# Patient Record
Sex: FEMALE | Race: WHITE
[De-identification: names, ages, dates, MRNs, and addresses within clinical notes are randomized per-mention and may not be internally consistent; named-entity substitution may affect disease eponyms.]

---

## 2018-05-08 ENCOUNTER — HOSPITAL ENCOUNTER (OUTPATIENT)
Dept: HOSPITAL 97 - ER | Age: 58
Setting detail: OBSERVATION
LOS: 1 days | Discharge: HOME | End: 2018-05-09
Attending: FAMILY MEDICINE | Admitting: FAMILY MEDICINE
Payer: COMMERCIAL

## 2018-05-08 VITALS — OXYGEN SATURATION: 99 %

## 2018-05-08 VITALS — BODY MASS INDEX: 29.2 KG/M2

## 2018-05-08 DIAGNOSIS — J44.9: ICD-10-CM

## 2018-05-08 DIAGNOSIS — F17.210: ICD-10-CM

## 2018-05-08 DIAGNOSIS — F32.9: ICD-10-CM

## 2018-05-08 DIAGNOSIS — K52.9: ICD-10-CM

## 2018-05-08 DIAGNOSIS — I10: ICD-10-CM

## 2018-05-08 DIAGNOSIS — N30.00: Primary | ICD-10-CM

## 2018-05-08 LAB
ALBUMIN SERPL BCP-MCNC: 3.8 G/DL (ref 3.2–5.5)
ALP SERPL-CCNC: 87 IU/L (ref 42–121)
ALT SERPL W P-5'-P-CCNC: 16 IU/L (ref 10–60)
AST SERPL W P-5'-P-CCNC: 20 IU/L (ref 10–42)
BUN BLD-MCNC: 10 MG/DL (ref 6–20)
CKMB CREATINE KINASE MB: 1.5 NG/ML (ref 0.3–4)
GLUCOSE SERPLBLD-MCNC: 102 MG/DL (ref 65–120)
HCT VFR BLD CALC: 43.7 % (ref 36–45)
INR BLD: 1.03
LIPASE SERPL-CCNC: 19 U/L (ref 22–51)
LYMPHOCYTES # SPEC AUTO: 2.8 K/UL (ref 0.7–4.9)
MAGNESIUM SERPL-MCNC: 1.9 MG/DL (ref 1.8–2.5)
MCH RBC QN AUTO: 25.8 PG (ref 27–35)
MCV RBC: 78.6 FL (ref 80–100)
PMV BLD: 7.6 FL (ref 7.6–11.3)
POTASSIUM SERPL-SCNC: 3.5 MEQ/L (ref 3.6–5)
RBC # BLD: 5.56 M/UL (ref 3.86–4.86)

## 2018-05-08 PROCEDURE — 36415 COLL VENOUS BLD VENIPUNCTURE: CPT

## 2018-05-08 PROCEDURE — 74177 CT ABD & PELVIS W/CONTRAST: CPT

## 2018-05-08 PROCEDURE — 80053 COMPREHEN METABOLIC PANEL: CPT

## 2018-05-08 PROCEDURE — 87077 CULTURE AEROBIC IDENTIFY: CPT

## 2018-05-08 PROCEDURE — 85610 PROTHROMBIN TIME: CPT

## 2018-05-08 PROCEDURE — 82550 ASSAY OF CK (CPK): CPT

## 2018-05-08 PROCEDURE — 71045 X-RAY EXAM CHEST 1 VIEW: CPT

## 2018-05-08 PROCEDURE — 87088 URINE BACTERIA CULTURE: CPT

## 2018-05-08 PROCEDURE — 87086 URINE CULTURE/COLONY COUNT: CPT

## 2018-05-08 PROCEDURE — 83690 ASSAY OF LIPASE: CPT

## 2018-05-08 PROCEDURE — 81003 URINALYSIS AUTO W/O SCOPE: CPT

## 2018-05-08 PROCEDURE — 99285 EMERGENCY DEPT VISIT HI MDM: CPT

## 2018-05-08 PROCEDURE — 85730 THROMBOPLASTIN TIME PARTIAL: CPT

## 2018-05-08 PROCEDURE — 89055 LEUKOCYTE ASSESSMENT FECAL: CPT

## 2018-05-08 PROCEDURE — 96375 TX/PRO/DX INJ NEW DRUG ADDON: CPT

## 2018-05-08 PROCEDURE — 87046 STOOL CULTR AEROBIC BACT EA: CPT

## 2018-05-08 PROCEDURE — 85025 COMPLETE CBC W/AUTO DIFF WBC: CPT

## 2018-05-08 PROCEDURE — 87493 C DIFF AMPLIFIED PROBE: CPT

## 2018-05-08 PROCEDURE — 83880 ASSAY OF NATRIURETIC PEPTIDE: CPT

## 2018-05-08 PROCEDURE — 87186 SC STD MICRODIL/AGAR DIL: CPT

## 2018-05-08 PROCEDURE — 84484 ASSAY OF TROPONIN QUANT: CPT

## 2018-05-08 PROCEDURE — 80156 ASSAY CARBAMAZEPINE TOTAL: CPT

## 2018-05-08 PROCEDURE — 96361 HYDRATE IV INFUSION ADD-ON: CPT

## 2018-05-08 PROCEDURE — 80076 HEPATIC FUNCTION PANEL: CPT

## 2018-05-08 PROCEDURE — 82553 CREATINE MB FRACTION: CPT

## 2018-05-08 PROCEDURE — 96368 THER/DIAG CONCURRENT INF: CPT

## 2018-05-08 PROCEDURE — 82962 GLUCOSE BLOOD TEST: CPT

## 2018-05-08 PROCEDURE — 96365 THER/PROPH/DIAG IV INF INIT: CPT

## 2018-05-08 PROCEDURE — 87045 FECES CULTURE AEROBIC BACT: CPT

## 2018-05-08 PROCEDURE — 83735 ASSAY OF MAGNESIUM: CPT

## 2018-05-08 PROCEDURE — 80048 BASIC METABOLIC PNL TOTAL CA: CPT

## 2018-05-08 PROCEDURE — 84100 ASSAY OF PHOSPHORUS: CPT

## 2018-05-08 PROCEDURE — 93005 ELECTROCARDIOGRAM TRACING: CPT

## 2018-05-08 RX ADMIN — SODIUM CHLORIDE SCH: 0.9 INJECTION, SOLUTION INTRAVENOUS at 21:13

## 2018-05-08 RX ADMIN — Medication SCH: at 21:13

## 2018-05-08 RX ADMIN — CIPROFLOXACIN SCH: 2 INJECTION, SOLUTION INTRAVENOUS at 21:13

## 2018-05-08 NOTE — EDPHYS
Physician Documentation                                                                           

 [facility]                                                                

Name: Rima Solares                                                                                

Age: 58 yrs                                                                                       

Sex: Female                                                                                       

: 1960                                                                                   

MRN: [id number]                                                                                   

Arrival Date: 2018                                                                          

Time: 16:04                                                                                       

Account#: [id number]                                                                            

Bed 19                                                                                            

Private MD:                                                                                       

ED Physician Edgardo Shaikh                                                                      

HPI:                                                                                              

                                                                                             

16:20 This 58 yrs old  Female presents to ER via EMS with complaints of Abdominal    karuna 

      Cramping, Nausea/Vomiting/Diarrhea.                                                         

                                                                                                  

Historical:                                                                                       

- Allergies:                                                                                      

16:17 Trazodone;                                                                              ae1 

- Home Meds:                                                                                      

16:17 Lisinopril Oral [Active]; Abilify oral oral [Active]; Carbamazepine Oral [Active];      ae1 

16:26 aspirin 81 mg oral chew [Active]; Paxil 20 mg oral tab 1 tab once daily [Active];       ae1 

      metoprolol tartrate 25 mg oral tab 1.5 tab 2 times per day [Active]; atorvastatin 80 mg     

      oral tab 1 tab nightly [Active]; BRILINTA 90 mg oral tab [Active]; omeprazole 30 mg         

      Oral once daily [Active];                                                                   

- PMHx:                                                                                           

16:17 Anxiety; COPD; Depression; Hypertension; Hyperlipidemia; Diabetes - NIDDM;              ae1 

                                                                                                  

- Immunization history:: Adult Immunizations not up to date.                                      

- Social history:: Smoking status: Patient uses tobacco products, smokes one pack                 

  cigarettes per day.                                                                             

                                                                                                  

                                                                                                  

ROS:                                                                                              

16:21 Constitutional: Negative for fever, chills, and weight loss, Eyes: Negative for injury, karuna 

      pain, redness, and discharge, ENT: Negative for injury, pain, and discharge, Neck:          

      Negative for injury, pain, and swelling, Cardiovascular: Negative for chest pain,           

      palpitations, and edema, Respiratory: Negative for shortness of breath, cough,              

      wheezing, and pleuritic chest pain, Back: Negative for injury and pain, : Negative        

      for injury, bleeding, discharge, and swelling, MS/Extremity: Negative for injury and        

      deformity, Skin: Negative for injury, rash, and discoloration, Neuro: Negative for          

      headache, weakness, numbness, tingling, and seizure, Psych: Negative for depression,        

      anxiety, suicide ideation, homicidal ideation, and hallucinations, Allergy/Immunology:      

      Negative for hives, rash, and allergies, Endocrine: Negative for neck swelling,             

      polydipsia, polyuria, polyphagia, and marked weight changes, Hematologic/Lymphatic:         

      Negative for swollen nodes, abnormal bleeding, and unusual bruising.                        

16:21 Abdomen/GI: Positive for abdominal pain, nausea and vomiting, diarrhea.                     

                                                                                                  

Exam:                                                                                             

16:21 Constitutional:  This is a well developed, well nourished patient who is awake, alert,  karuna 

      and in no acute distress. Head/Face:  Normocephalic, atraumatic. Eyes:  Pupils equal        

      round and reactive to light, extra-ocular motions intact.  Lids and lashes normal.          

      Conjunctiva and sclera are non-icteric and not injected.  Cornea within normal limits.      

      Periorbital areas with no swelling, redness, or edema. ENT:  Nares patent. No nasal         

      discharge, no septal abnormalities noted.  Tympanic membranes are normal and external       

      auditory canals are clear.  Oropharynx with no redness, swelling, or masses, exudates,      

      or evidence of obstruction, uvula midline.  Mucous membranes moist. Neck:  Trachea          

      midline, no thyromegaly or masses palpated, and no cervical lymphadenopathy.  Supple,       

      full range of motion without nuchal rigidity, or vertebral point tenderness.  No            

      Meningismus. Chest/axilla:  Normal chest wall appearance and motion.  Nontender with no     

      deformity.  No lesions are appreciated. Cardiovascular:  Regular rate and rhythm with a     

      normal S1 and S2.  No gallops, murmurs, or rubs.  Normal PMI, no JVD.  No pulse             

      deficits. Respiratory:  Lungs have equal breath sounds bilaterally, clear to                

      auscultation and percussion.  No rales, rhonchi or wheezes noted.  No increased work of     

      breathing, no retractions or nasal flaring. Abdomen/GI:  Soft, non-tender, with normal      

      bowel sounds.  No distension or tympany.  No guarding or rebound.  No evidence of           

      tenderness throughout. Back:  No spinal tenderness.  No costovertebral tenderness.          

      Full range of motion. Female :  Normal external genitalia. Skin:  Warm, dry with          

      normal turgor.  Normal color with no rashes, no lesions, and no evidence of cellulitis.     

18:12 Musculoskeletal/extremity: DVT Exam: No signs of deep vein thrombosis. no pain, no      karuna 

      swelling, no tenderness, negative Homans' sign noted on exam, no appreciated bluish         

      discoloration, no erythema, no increased warmth.                                            

                                                                                                  

Vital Signs:                                                                                      

16:05  / 75; Pulse 81; Resp 20; Temp 98.4(O); Pulse Ox 95% on R/A; Weight 77.11 kg (R); ae1 

17:19  / 73; Pulse 76; Resp 18; Pulse Ox 96% on R/A;                                    ae1 

17:30  / 73; Pulse 80; Resp 18; Pulse Ox 100% on R/A;                                   ae1 

18:00  / 75; Pulse 80; Resp 18; Pulse Ox 99% on R/A;                                    ae1 

19:22  / 83; Pulse 83; Resp 19; Temp 98.5(O);                                           ae1 

20:43  / 75; Pulse 80; Resp 20 S; Pulse Ox 99% on R/A;                                  ea  

                                                                                                  

MDM:                                                                                              

16:12 Patient medically screened.                                                             [facility] 

16:21 Data reviewed: vital signs, nurses notes, lab test result(s), EKG, radiologic studies,  karuna 

      plain films.                                                                                

                                                                                                  

                                                                                             

16:20 Order name: Basic Metabolic Panel                                                       [facility] 

                                                                                             

16:20 Order name: BNP                                                                         [facility] 

                                                                                             

16:20 Order name: CBC with Diff; Complete Time: 17:36                                         [facility] 

                                                                                             

16:20 Order name: Ckmb; Complete Time: 17:51                                                  [facility] 

                                                                                             

16:20 Order name: CPK; Complete Time: 17:51                                                   [facility] 

                                                                                             

16:20 Order name: LFT's; Complete Time: 17:51                                                 [facility] 

                                                                                             

16:20 Order name: Magnesium; Complete Time: 17:51                                             [facility] 

                                                                                             

16:20 Order name: PT-INR; Complete Time: 17:36                                                [facility] 

                                                                                             

16:20 Order name: Ptt, Activated; Complete Time: 17:36                                        [facility] 

                                                                                             

16:20 Order name: Troponin (emerg Dept Use Only); Complete Time: 17:41                        [facility] 

                                                                                             

16:20 Order name: Lipase; Complete Time: 17:51                                                [facility] 

                                                                                             

16:20 Order name: Stool Culture                                                               [facility] 

                                                                                             

16:20 Order name: Fecal Leukocyte Stain                                                       [facility] 

                                                                                             

16:20 Order name: CDIFF                                                                       [facility] 

                                                                                             

16:20 Order name: XRAY Chest (1 view); Complete Time: 17:36                                   [facility] 

                                                                                             

16:20 Order name: EKG; Complete Time: 16:21                                                   [facility] 

                                                                                             

16:20 Order name: Cardiac monitoring; Complete Time: 17:18                                    [facility] 

                                                                                             

16:20 Order name: CT Abd/Pelvis - W/Contrast                                                  [facility] 

                                                                                             

16:20 Order name: Basic Metabolic Panel; Complete Time: 17:51                                 [facility]

                                                                                             

16:20 Order name: BNP B-Type Natriuretic Peptide                                              [facility]

                                                                                             

16:24 Order name: Urine Culture                                                               [facility] 

                                                                                             

17:38 Order name: Tegretol Level                                                              [facility] 

                                                                                             

17:41 Order name: Urine Dipstick--Ancillary (enter results)                                     

                                                                                             

19:38 Order name: CT                                                                          [facility]

                                                                                             

16:20 Order name: EKG - Nurse/Tech; Complete Time: 16:22                                      [facility] 

                                                                                             

16:20 Order name: IV Saline Lock; Complete Time: 16:58                                        [facility] 

                                                                                             

16:20 Order name: Labs collected and sent; Complete Time: 16:58                               [facility] 

                                                                                             

16:20 Order name: O2 Per Protocol; Complete Time: 16:58                                       [facility] 

                                                                                             

16:20 Order name: O2 Sat Monitoring; Complete Time: 16:58                                     [facility] 

                                                                                             

16:20 Order name: Urine Dipstick-Ancillary (obtain specimen); Complete Time: 17:18            [facility] 

                                                                                                  

Administered Medications:                                                                         

16:45 Drug: NS 0.9% 1000 ml Route: IV; Rate: 1 bolus; Site: left forearm;                     ae1 

19:21 Follow up: IV Status: Completed infusion                                                ae1 

16:45 Drug: Zofran 4 mg Route: IVP; Site: left forearm;                                       ae1 

19:21 Follow up: Response: Nausea is decreased                                                ae1 

16:50 Drug: Pepcid 20 mg Route: IVP; Site: left forearm;                                      ae1 

19:21 Follow up: Response: No adverse reaction                                                ae1 

19:20 Drug: Cipro 400 mg Volume: 200 ml; Route: IVPB; Infused Over: 60 mins; Site: left upper ae1 

      arm;                                                                                        

20:52 Follow up: Response: No adverse reaction; IV Status: Completed infusion; IV Intake:     ea  

      200ml                                                                                       

19:21 Drug: Flagyl 500 mg Volume: 100 ml; Route: IVPB; Rate: 200 ml/hr; Infused Over: 30      ae1 

      mins; Site: left upper arm;                                                                 

20:51 Follow up: IV Status: Completed infusion; IV Intake: 100ml                              ea  

20:52 Drug: NS 0.9% with KCl 20 mEq/L 1000 ml {Note: left upper arm.} Route: IV; Rate: 100    ea  

      ml/hr; Site: Other;                                                                         

21:02 Follow up: Response: No adverse reaction; IV Status: Infusion continued upon admission  ea  

                                                                                                  

                                                                                                  

Disposition:                                                                                      

18 17:42 Hospitalization ordered by Ebony Arechiga for Inpatient Admission. Preliminary     

  diagnosis are Weakness, Vomiting, Diarrhea, unspecified, Hypotension,                           

  Hypokalemia, Elevated white blood cell count.                                                   

- Bed requested for Telemetry/MedSurg (Inpatient).                                                

- Status is Inpatient Admission.                                                              ea  

- Condition is Fair.                                                                              

- Problem is new.                                                                                 

- Symptoms have improved.                                                                         

UTI on Admission? No                                                                              

                                                                                                  

                                                                                                  

                                                                                                  

Signatures:                                                                                       

Dispatcher [employer]                           EDMS                                                 

Edgardo Shaikh MD MD cha Gallardo, Ana ag Elliott, Andrea, RN                     RN   ae1                                                  

Basia Blount RN                      RN   ea                                                   

                                                                                                  

Corrections: (The following items were deleted from the chart)                                    

16:26 16:17 Home Meds: Aspirin Oral; ae1                                                      ae1 

16:26 16:17 Home Meds: Paxil Oral; ae1                                                        ae1 

16:26 16:17 Home Meds: Metoprolol Tartrate Oral; ae1                                          ae1 

16:26 16:17 Home Meds: atorvastatin oral oral; ae1                                            ae1 

16:26 16:17 Home Meds: BRILINTA oral oral 1 tab 2 times per day; ae1                          ae1 

18:38 17:42 Hospitalization Ordered by Ebony Arechiga MD for Inpatient Admission. Preliminary  ag  

      diagnosis is Weakness; Vomiting; Diarrhea, unspecified; Hypotension; Hypokalemia;           

      Elevated white blood cell count. Bed requested for Telemetry/MedSurg (Inpatient).           

      Status is Inpatient Admission. Condition is Fair. Problem is new. Symptoms have             

      improved. UTI on Admission? No. karuna                                                         

21:07 18:38 2018 17:42 Hospitalization Ordered by Ebony Arechiga MD for Inpatient        ea  

      Admission. Preliminary diagnosis is Weakness; Vomiting; Diarrhea, unspecified;              

      Hypotension; Hypokalemia; Elevated white blood cell count. Bed requested for                

      Telemetry/MedSurg (Inpatient). Status is Inpatient Admission. Condition is Fair.            

      Problem is new. Symptoms have improved. UTI on Admission? No. ag                            

                                                                                                  

**************************************************************************************************

## 2018-05-08 NOTE — P.HP
Certification for Inpatient


Patient admitted to: Observation


With expected LOS: <2 Midnights


Patient will require the following post-hospital care: None


Practitioner: I am a practitioner with admitting privileges, knowledge of 

patient current condition, hospital course, and medical plan of care.


Services: Services provided to patient in accordance with Admission 

requirements found in Title 42 Section 412.3 of the Code of Federal Regulations





Patient History


Date of Service: 05/08/18


Primary Care Provider: None, [facility] 


Reason for admission: N/V


History of Present Illness: 





50-year-old female with significant past medical history of depression, COPD, 

smoker presented to the ED from the [facility].  Patient stated that she 

went to the [facility] today and was told to come to the ER because she 

appeared to be dehydrated.  Patient has had nausea vomiting and diarrhea for 

about 3-4 months now and has been followed up at [facility] for it.  He 

did do some lab work care however results are still pending.  Patient was then 

referred over to the ER to get further workup.  Patient denies having any fever 

chills shortness of breath or chest pain any other associated symptoms with it.

  Patient stated aside from diarrhea and nausea vomiting she has not had any 

other complaints.  Nausea and vomiting has been getting worse lately and she 

has not been able to keep anything down as well.


Allergies





trazodone Adverse Reaction (Verified 03/15/15 21:29)


 Nausea/Vomiting





Home Medications: 








Albuterol Sulfate [Proair Hfa] 2 puff IH Q4HP PRN 03/15/15 


Aripiprazole [Abilify] 30 mg PO BEDTIME 03/15/15 


Carbamazepine [Carbamazepine ER] 200 mg PO TID 03/15/15 


Gemfibrozil 600 mg PO BID 03/15/15 


Lisinopril 10 mg PO DAILY 03/15/15 


Paroxetine HCl [Paxil] 20 mg PO DAILY 03/15/15 


Fluticasone/Salmeterol [Advair 250/50 Diskus] 1 puff IH BID #1 disk 03/17/15 


Levofloxacin [Levaquin] 500 mg PO DAILY #7 tab 03/17/15 








- Past Medical/Surgical History


Diabetic: No


-: copd


-: htn


-: high cholesterol


-: deression


-: anxiety


-: bronchitis


-: neck sx x2-


-: "nerve fusion"





- Family History


  ** Father


-: Heart disease, Hypertension


Notes: pt unsure of details





- Social History


Alcohol use: No


CD- Drugs: No


Caffeine use: Yes





Review of Systems


General: As per HPI





Physical Examination





- Physical Exam


General: Alert, In no apparent distress


HEENT: Atraumatic, PERRLA, Mucous membr. moist/pink, EOMI, Sclerae nonicteric


Neck: Supple, 2+ carotid pulse no bruit, No LAD, Without JVD or thyroid 

abnormality


Respiratory: Clear to auscultation bilaterally, Normal air movement


Cardiovascular: Regular rate/rhythm, Normal S1 S2


Gastrointestinal: Normal bowel sounds, No tenderness


Musculoskeletal: No tenderness


Integumentary: No rashes


Neurological: Normal gait, Normal speech, Normal strength at 5/5 x4 extr, 

Normal tone, Normal affect


Lymphatics: No axilla or inguinal lymphadenopathy





- Studies


Laboratory Data (last 24 hrs)





05/08/18 16:44: PT 12.1, INR 1.03, APTT 25.7


05/08/18 16:44: WBC 15.3 H, Hgb 14.4, Hct 43.7, Plt Count 480 H


05/08/18 16:44: Sodium 136, Potassium 3.5 L, BUN 10, Creatinine 0.78, Glucose 

102, Magnesium 1.9, Total Bilirubin 0.2 L, AST 20, ALT 16, Alkaline Phosphatase 

87, Lipase 19 L








Assessment and Plan





- Problems (Diagnosis)


(1) Diarrhea


Current Visit: Yes   Status: Acute   


Plan: 


Chronic Diarrhea since 4 to 5 months now with Nausea and vomiting. 


-Stool studies pending 


-Cipro and flagyl 


-CT abd pending


-IV fluids and CLD 





Qualifiers: 


   Diarrhea type: unspecified type   Qualified Code(s): R19.7 - Diarrhea, 

unspecified   





(2) Intractable nausea and vomiting


Current Visit: Yes   Status: Acute   


Plan: 


Worsening now. Intractable n/v 


-Zofran PRN 





Qualifiers: 


   Vomiting type: cyclical vomiting   Qualified Code(s): G43.A1 - Cyclical 

vomiting, intractable   





(3) COPD (chronic obstructive pulmonary disease)


Current Visit: Yes   Status: Chronic   


Plan: 


Stable


-Restart home medication 





Qualifiers: 


   COPD type: unspecified COPD   Qualified Code(s): J44.9 - Chronic obstructive 

pulmonary disease, unspecified   





(4) Depression


Current Visit: Yes   Status: Chronic   


Qualifiers: 


   Depression Type: unspecified   Qualified Code(s): F32.9 - Major depressive 

disorder, single episode, unspecified   


Discharge Plan: Home


Plan to discharge in: 24 Hours





- Advance Directives


Does patient have a Living Will: No


Does patient have a Durable POA for Healthcare: No





- Code Status/Comfort Care


Code Status Assessed: Yes


Critical Care: No

## 2018-05-08 NOTE — RAD REPORT
EXAM DESCRIPTION:  RAD - Chest Single View - 5/8/2018 5:07 pm

 

CLINICAL HISTORY:  Chest pain, nausea and vomiting.

 

COMPARISON:  03/24/2017, 01/22/2016

 

FINDINGS:  Portable technique limits examination quality.

 

The lungs are grossly clear. The heart is normal in size. No displaced fractures.Cervical spine hardw
are present.

 

IMPRESSION:  No acute intrathoracic process suspected.

## 2018-05-08 NOTE — RAD REPORT
EXAM DESCRIPTION:  CT - Abdomen   Pelvis W Contrast - 5/8/2018 7:11 pm

 

CLINICAL HISTORY:   Abdominal pain. Nausea, vomiting and diarrhea

 

COMPARISON:  None.

 

TECHNIQUE:  Computed axial tomography of the abdomen and pelvis was obtained. 100 cc Isovue-300 is ad
ministered intravenously. Oral contrast was given.

 

All CT scans are performed using dose optimization technique as appropriate and may include automated
 exposure control or mA/KV adjustment according to patient size.

 

FINDINGS:

The liver, spleen, pancreas, adrenals and kidneys appear unremarkable.

 

Mild dilatation of the common bile duct is present.

 

Portions of the colon are mildly thickened.

 

The appendix is mildly thickened. However there is contrast within portions of the lumen.

 

 

IMPRESSION:  Mild dilatation of the wall of portions of the colon consistent with a mild colitis.

 

Mild dilatation of the appendix. My suspicion is that this is not significant given the colitis. Freeman
jenny a superimposed appendicitis has a similar appearance. Followup CT examination may be helpful for 
re-evaluation.

 

Mild dilatation of the common bile duct

## 2018-05-08 NOTE — ER
Nurse's Notes                                                                                     

 [facility]                                                                

Name: Rima Solares                                                                                

Age: 58 yrs                                                                                       

Sex: Female                                                                                       

: 1960                                                                                   

MRN: [id number]                                                                                   

Arrival Date: 2018                                                                          

Time: 16:04                                                                                       

Account#: [id number]                                                                            

Bed 19                                                                                            

Private MD:                                                                                       

Diagnosis: Weakness;Vomiting;Diarrhea, unspecified;Hypotension;Hypokalemia;Elevated white blood   

  cell count                                                                                      

                                                                                                  

Presentation:                                                                                     

                                                                                             

16:08 Presenting complaint: EMS states: EMS states patient has been suffering from nausea,    ae1 

      vomiting, diarrhea for the last few months. Today patient reports fatigue and lethargy.     

      Was seen a [facility] and was told to have and ultrasound of her abdomen and that      

      she was hypotensive while at the clinic. EMS reports BP 98/63 and FSBS 107. Transition      

      of care: patient was not received from another setting of care. Onset of symptoms is        

      unknown. Care prior to arrival: EMS states V/S BP 98/63, 74/38.                             

16:08 Method Of Arrival: EMS: [facility]                                                    ae1 

16:08 Acuity: MARCELA 3                                                                           ae1 

16:59 Initial Sepsis Screen: Does the patient meet any 2 criteria? No. Patient's initial      ae1 

      sepsis screen is negative. Does the patient have a suspected source of infection? Yes:      

      Acute abdominal pain.                                                                       

                                                                                                  

Triage Assessment:                                                                                

16:07 General: Appears comfortable, Behavior is calm, cooperative. Pain: Complains of pain in ae1 

      abdomen. Respiratory: Airway is patent Respiratory effort is even, unlabored,               

      Respiratory pattern is regular, symmetrical.                                                

16:07 GI: Abdomen is round.                                                                   ae1 

16:07 EENT: Oral mucosa is dry. Poor dentition noted. Neuro: Level of Consciousness is awake, ae1 

      alert, obeys commands, Oriented to person, place, time, situation. Cardiovascular:          

      Heart tones S1 S2 present Patient's skin is warm and dry. : No signs and/or symptoms      

      were reported regarding the genitourinary system.                                           

16:07 GI: Bowel sounds present X 4 quads. Reports lower abdominal pain, cramping, diarrhea,   ae1 

      nausea, vomiting. Derm: No signs and/or symptoms reported regarding the dermatologic        

      system. Musculoskeletal: No signs and/or symptoms reported regarding the                    

      musculoskeletal system.                                                                     

                                                                                                  

Historical:                                                                                       

- Allergies:                                                                                      

16:17 Trazodone;                                                                              ae1 

- Home Meds:                                                                                      

16:17 Lisinopril Oral [Active]; Abilify oral oral [Active]; Carbamazepine Oral [Active];      ae1 

16:26 aspirin 81 mg oral chew [Active]; Paxil 20 mg oral tab 1 tab once daily [Active];       ae1 

      metoprolol tartrate 25 mg oral tab 1.5 tab 2 times per day [Active]; atorvastatin 80 mg     

      oral tab 1 tab nightly [Active]; BRILINTA 90 mg oral tab [Active]; omeprazole 30 mg         

      Oral once daily [Active];                                                                   

- PMHx:                                                                                           

16:17 Anxiety; COPD; Depression; Hypertension; Hyperlipidemia; Diabetes - NIDDM;              ae1 

                                                                                                  

- Immunization history:: Adult Immunizations not up to date.                                      

- Social history:: Smoking status: Patient uses tobacco products, smokes one pack                 

  cigarettes per day.                                                                             

                                                                                                  

                                                                                                  

Screenin:59 Abuse screen: Denies threats or abuse. Nutritional screening: No deficits noted.        ae1 

      Tuberculosis screening: No symptoms or risk factors identified. Fall Risk None              

      identified.                                                                                 

                                                                                                  

Assessment:                                                                                       

16:07 GI: Bowel sounds present X 4 quads. Abd is soft Abdomen is tender to palpation in right ae1 

      lower quadrant and left lower quadrant.                                                     

17:00 Reassessment: Patient appears in no apparent distress at this time. Patient and/or      ae1 

      family updated on plan of care and expected duration. Pain level reassessed. Patient        

      states feeling better.                                                                      

18:37 Reassessment: IV to the left forearm infiltrated, IV removed.                           ae1 

19:15 General: Appears in no apparent distress. Behavior is calm, cooperative, appropriate    ea  

      for age. Pain: Complains of pain in left lower quadrant and right lower quadrant Pain       

      does not radiate. Pain currently is 6 out of 10 on a pain scale. Quality of pain is         

      described as crampy. Neuro: Level of Consciousness is awake, alert, obeys commands,         

      Oriented to person, place, time, situation. Cardiovascular: No deficits noted.              

      Respiratory: Airway is patent Respiratory effort is even, unlabored, Respiratory            

      pattern is regular, symmetrical. GI: Bowel sounds present X 4 quads. Abd is soft X 4        

      quads Abdomen is tender to palpation in right lower quadrant and left lower quadrant.       

      : No signs and/or symptoms were reported regarding the genitourinary system. EENT: No     

      signs and/or symptoms were reported regarding the EENT system. Derm: Skin is pink, warm     

      \T\ dry.                                                                                    

20:44 Reassessment: Patient and/or family updated on plan of care and expected duration. Pain ea  

      level reassessed. Patient is alert, oriented x 3, equal unlabored respirations, skin        

      warm/dry/pink.                                                                              

21:00 Reassessment: Patient and/or family updated on plan of care and expected duration. Pain ea  

      level reassessed. Patient is alert, oriented x 3, equal unlabored respirations, skin        

      warm/dry/pink. Report called to Diane ALLEN on fourth floor.                                   

                                                                                                  

Vital Signs:                                                                                      

16:05  / 75; Pulse 81; Resp 20; Temp 98.4(O); Pulse Ox 95% on R/A; Weight 77.11 kg (R); ae1 

17:19  / 73; Pulse 76; Resp 18; Pulse Ox 96% on R/A;                                    ae1 

17:30  / 73; Pulse 80; Resp 18; Pulse Ox 100% on R/A;                                   ae1 

18:00  / 75; Pulse 80; Resp 18; Pulse Ox 99% on R/A;                                    ae1 

19:22  / 83; Pulse 83; Resp 19; Temp 98.5(O);                                           ae1 

20:43  / 75; Pulse 80; Resp 20 S; Pulse Ox 99% on R/A;                                  ea  

                                                                                                  

ED Course:                                                                                        

16:04 Patient arrived in ED.                                                                  ae1 

16:11 Edgardo Shaikh MD is Attending Physician.                                             karuna 

16:11 Triage completed.                                                                       ae1 

16:18 Arm band placed on right wrist.                                                         ae1 

16:18 Bed in low position. Call light in reach. Side rails up X2. Pulse ox on. NIBP on. Warm  ae1 

      blanket given.                                                                              

16:21 Klever Christina, RN is Primary Nurse.                                                   ae1 

16:58 Inserted saline lock: 22 gauge in left forearm, using aseptic technique. Blood          ae1 

      collected. Missed attempt(s): 22 gauge in right antecubital area.                           

17:06 X-ray completed. Portable x-ray completed in exam room. Patient tolerated procedure     bb2 

      well.                                                                                       

17:08 XRAY Chest (1 view) In Process Unspecified.                                             EDMS

17:41 Ebony Arechiga MD is Hospitalizing Provider.                                           karuna 

18:39 Radiology exam delayed due to IV insertion attempt and/or patient not having            vm2 

      appropriate IV at this time.                                                                

18:39 Missed attempt(s): 24 gauge in left wrist.                                              ae1 

18:39 Missed attempt(s): 24 gauge in left antecubital area.                                   ae1 

18:45 Inserted saline lock: 22 gauge in right forearm, using aseptic technique.               iw  

19:07 Inserted saline lock: 22 gauge in left upper arm, using aseptic technique. ,using       ae1 

      aseptic technique. By MIYA Paula RN.                                                     

19:32 Primary Nurse role handed off by Klever Christina RN                                    rg2 

19:35 Basia Blount, ALEJANDRO is Primary Nurse.                                                    ea  

21:01 No provider procedures requiring assistance completed. Patient admitted, IV remains in  ea  

      place.                                                                                      

                                                                                                  

Administered Medications:                                                                         

16:45 Drug: NS 0.9% 1000 ml Route: IV; Rate: 1 bolus; Site: left forearm;                     ae1 

19:21 Follow up: IV Status: Completed infusion                                                ae1 

16:45 Drug: Zofran 4 mg Route: IVP; Site: left forearm;                                       ae1 

19:21 Follow up: Response: Nausea is decreased                                                ae1 

16:50 Drug: Pepcid 20 mg Route: IVP; Site: left forearm;                                      ae1 

19:21 Follow up: Response: No adverse reaction                                                ae1 

19:20 Drug: Cipro 400 mg Volume: 200 ml; Route: IVPB; Infused Over: 60 mins; Site: left upper ae1 

      arm;                                                                                        

20:52 Follow up: Response: No adverse reaction; IV Status: Completed infusion; IV Intake:     ea  

      200ml                                                                                       

19:21 Drug: Flagyl 500 mg Volume: 100 ml; Route: IVPB; Rate: 200 ml/hr; Infused Over: 30      ae1 

      mins; Site: left upper arm;                                                                 

20:51 Follow up: IV Status: Completed infusion; IV Intake: 100ml                              ea  

20:52 Drug: NS 0.9% with KCl 20 mEq/L 1000 ml {Note: left upper arm.} Route: IV; Rate: 100    ea  

      ml/hr; Site: Other;                                                                         

21:02 Follow up: Response: No adverse reaction; IV Status: Infusion continued upon admission  ea  

                                                                                                  

                                                                                                  

Intake:                                                                                           

20:51 IV: 100ml; Total: 100ml.                                                                ea  

20:52 IV: 200ml; Total: 300ml.                                                                ea  

                                                                                                  

Outcome:                                                                                          

17:42 Decision to Hospitalize by Provider.                                                    karuna 

21:01 Admitted to Med/surg accompanied by tech, room 429, with chart, Report called to  Diane coronado  

      RN                                                                                          

21:01 Condition: stable                                                                           

21:01 Instructed on the need for admit.                                                           

21:07 Patient left the ED.                                                                    ea  

                                                                                                  

Signatures:                                                                                       

Dispatcher [employer]                           EDMS                                                 

Mary Jamari                               rg2                                                  

Edgardo Shaikh MD MD cha Williams, Irene RN                     Klever Justice RN                     RN   ae1                                                  

Mera Mojica                            2                                                  

Basia Blount RN RN ea Bock, Brittany                               bb2                                                  

                                                                                                  

Corrections: (The following items were deleted from the chart)                                    

16: 16:17 Home Meds: Aspirin Oral; ae1                                                      ae1 

16: 16:17 Home Meds: Paxil Oral; ae1                                                        ae1 

16: 16:17 Home Meds: Metoprolol Tartrate Oral; ae1                                          ae1 

16:26 16:17 Home Meds: atorvastatin oral oral; ae1                                            ae1 

16:26 16:17 Home Meds: BRILINTA oral oral 1 tab 2 times per day; ae1                          ae1 

18:46 16:07 GI: Abdomen is round obese, ae1                                                   ae1 

                                                                                                  

**************************************************************************************************

## 2018-05-09 VITALS — SYSTOLIC BLOOD PRESSURE: 134 MMHG | DIASTOLIC BLOOD PRESSURE: 75 MMHG

## 2018-05-09 VITALS — TEMPERATURE: 98.1 F

## 2018-05-09 LAB
ALBUMIN SERPL BCP-MCNC: 2.8 G/DL (ref 3.2–5.5)
ALP SERPL-CCNC: 70 IU/L (ref 42–121)
ALT SERPL W P-5'-P-CCNC: 12 IU/L (ref 10–60)
AST SERPL W P-5'-P-CCNC: 15 IU/L (ref 10–42)
BUN BLD-MCNC: 6 MG/DL (ref 6–20)
GLUCOSE SERPLBLD-MCNC: 110 MG/DL (ref 65–120)
HCT VFR BLD CALC: 37.2 % (ref 36–45)
LYMPHOCYTES # SPEC AUTO: 1.9 K/UL (ref 0.7–4.9)
MAGNESIUM SERPL-MCNC: 1.9 MG/DL (ref 1.8–2.5)
MCH RBC QN AUTO: 25.9 PG (ref 27–35)
MCV RBC: 79.2 FL (ref 80–100)
PMV BLD: 7.4 FL (ref 7.6–11.3)
POTASSIUM SERPL-SCNC: 3.6 MEQ/L (ref 3.6–5)
RBC # BLD: 4.7 M/UL (ref 3.86–4.86)

## 2018-05-09 RX ADMIN — CIPROFLOXACIN SCH MLS: 2 INJECTION, SOLUTION INTRAVENOUS at 08:17

## 2018-05-09 RX ADMIN — METRONIDAZOLE SCH MLS: 500 INJECTION, SOLUTION INTRAVENOUS at 08:17

## 2018-05-09 RX ADMIN — Medication SCH: at 08:18

## 2018-05-09 RX ADMIN — CIPROFLOXACIN SCH: 2 INJECTION, SOLUTION INTRAVENOUS at 09:00

## 2018-05-09 RX ADMIN — SODIUM CHLORIDE SCH MLS: 0.9 INJECTION, SOLUTION INTRAVENOUS at 05:40

## 2018-05-09 RX ADMIN — METRONIDAZOLE SCH MLS: 500 INJECTION, SOLUTION INTRAVENOUS at 00:41

## 2018-05-09 NOTE — EKG
Test Date:    2018-05-08               Test Time:    16:23:16

Technician:   TOBIAS                                     

                                                     

MEASUREMENT RESULTS:                                       

Intervals:                                           

Rate:         80                                     

NY:           170                                    

QRSD:         90                                     

QT:           392                                    

QTc:          452                                    

Axis:                                                

P:            50                                     

NY:           170                                    

QRS:          29                                     

T:            31                                     

                                                     

INTERPRETIVE STATEMENTS:                                       

                                                     

Normal sinus rhythm

Inferior infarct, age undetermined

Abnormal ECG

Compared to ECG 03/24/2017 17:37:40

No significant changes



Electronically Signed On 05-09-18 06:57:32 CDT by Campos Parkinson

## 2018-05-09 NOTE — P.SSS
Patient History


Date of Service: 05/09/18


Primary Care Provider: None, [facility] 


Reason for admission: N/V


History of Present Illness: 





50-year-old female with significant past medical history of depression, COPD, 

smoker presented to the ED from the [facility].  Patient stated that she 

went to the [facility] [place] today and was told to come to the ER because she 

appeared to be dehydrated.  Patient has had nausea vomiting and diarrhea for 

about 3-4 months now and has been followed up at [facility] [place] for it.  He 

did do some lab work care however results are still pending.  Patient was then 

referred over to the ER to get further workup.  Patient denies having any fever 

chills shortness of breath or chest pain any other associated symptoms with it.

  Patient stated aside from diarrhea and nausea vomiting she has not had any 

other complaints.  Nausea and vomiting has been getting worse lately and she 

has not been able to keep anything down as well.


Allergies





trazodone Adverse Reaction (Verified 03/15/15 21:29)


 Nausea/Vomiting





Home Medications: 








Aripiprazole [Abilify] 30 mg PO DAILY 03/15/15 


Carbamazepine [Carbamazepine ER] 200 mg PO TID 03/15/15 


Lisinopril 5 mg PO DAILY 03/15/15 


Paroxetine HCl [Paxil] 20 mg PO DAILY 03/15/15 


Aspirin Chewable [Aspirin Chewable*] 81 mg PO DAILY 05/08/18 


Atorvastatin Calcium [Lipitor] 80 mg PO BEDTIME 05/08/18 


Ergocalciferol (Vitamin D2) [Vitamin D 50,000 Unit Cap] 50,000 unit PO EVERY 

7TH DAY 05/08/18 


Metformin ER [Glucophage ER*] 1,000 mg PO BID 05/08/18 


Metoprolol Tartrate [Lopressor*] 12.5 mg PO DAILY 05/08/18 


Ticagrelor [Brilinta*] 90 mg PO BID 05/08/18 


Ciprofloxacin HCl [Cipro 500 MG Tablet] 500 mg PO DAILY #10 tab 05/09/18 


Metronidazole [Flagyl] 500 mg PO Q8H #30 tablet 05/09/18 








- Past Medical/Surgical History


Has patient received pneumonia vaccine in the past: Yes


Diabetic: Yes


-: copd


-: htn


-: high cholesterol


-: depression


-: anxiety


-: bronchitis


-: neck sx x2-


-: "nerve fusion"


-: hysterectomy





- Family History


  ** Mother


-: Hypertension





  ** Father


-: Heart disease, Hypertension


Notes: pt unsure of details





- Social History


Smoking Status: Current some day smoker


Alcohol use: No


CD- Drugs: No


Caffeine use: Yes


Place of Residence: Home





Review of Systems


10-point ROS is otherwise unremarkable





Physical Examination





- Vital Signs


Temperature: 98.1 F


Blood Pressure: 134/75


Pulse: 82


Respirations: 18


Pulse Ox (%): 93





- Physical Exam


General: Alert, In no apparent distress


HEENT: Atraumatic, PERRLA, Mucous membr. moist/pink, EOMI, Sclerae nonicteric


Neck: Supple, 2+ carotid pulse no bruit, No LAD, Without JVD or thyroid 

abnormality


Respiratory: Clear to auscultation bilaterally, Normal air movement


Cardiovascular: Regular rate/rhythm, Normal S1 S2


Gastrointestinal: Normal bowel sounds, No tenderness


Musculoskeletal: No tenderness


Integumentary: No rashes


Neurological: Normal gait, Normal speech, Normal strength at 5/5 x4 extr, 

Normal tone, Normal affect


Lymphatics: No axilla or inguinal lymphadenopathy





- Studies


Laboratory Data (last 24 hrs)





05/08/18 16:44: PT 12.1, INR 1.03, APTT 25.7


05/08/18 16:44: WBC 15.3 H, Hgb 14.4, Hct 43.7, Plt Count 480 H


05/08/18 16:44: B-Natriuretic Peptide 57


05/08/18 16:44: Sodium 136, Potassium 3.5 L, BUN 10, Creatinine 0.78, Glucose 

102, Magnesium 1.9, Total Bilirubin 0.2 L, AST 20, ALT 16, Alkaline Phosphatase 

87, Lipase 19 L





Microbiology Data (last 24 hrs): 








05/08/18 17:36   Stool   Fecal Leukocyte Stain - Final








- Diagnosis (Problem(s))


(1) Urinary tract infectious disease


Onset Date: 03/16/15   Status: Acute   


Plan: 


Dc with Cipro 


Qualifiers: 


   Urinary tract infection type: acute cystitis   Hematuria presence: without 

hematuria   Qualified Code(s): N30.00 - Acute cystitis without hematuria   





(2) Colitis


Status: Acute   





(3) Diarrhea


Onset Date: 05/09/18   Status: Acute   


Qualifiers: 


   Diarrhea type: unspecified type   Qualified Code(s): R19.7 - Diarrhea, 

unspecified   





(4) Intractable nausea and vomiting


Onset Date: 05/09/18   Status: Acute   


Qualifiers: 


   Vomiting type: cyclical vomiting   Qualified Code(s): G43.A1 - Cyclical 

vomiting, intractable   





(5) COPD (chronic obstructive pulmonary disease)


Onset Date: 05/09/18   Status: Chronic   


Qualifiers: 


   COPD type: unspecified COPD   Qualified Code(s): J44.9 - Chronic obstructive 

pulmonary disease, unspecified   





(6) Depression


Onset Date: 05/09/18   Status: Chronic   


Qualifiers: 


   Depression Type: unspecified   Qualified Code(s): F32.9 - Major depressive 

disorder, single episode, unspecified   


Treatment Summary: 


Overall pt remained stable here in the hospital. 





Pt was admitted to the hospital for nausea vomiting and diarrhea.  Abdominal CT 

was consistent with colitis.  Patient was kept on IV Cipro and Flagyl here in 

the hospital had marked improvement day 2 and thus was discharged home under 

stable condition.  Patient also had a urine culture done here which was 

positive for 2+ gram-negative rods.  Patient was asked to follow up with 

primary care giver  or call the floor here for sensitivity report.  Patient 

at this time will be discharged home on p.o. ciprofloxacin and Flagyl to cover 

for colitis in UTI.  Patient was asked to follow up with primary care doctor in 

about 1-2 days post discharge as well.  Patient does go to [facility] for 

her primary needs.





- Disposition


Disposition: ROUTINE DISCHARGE


Condition: GOOD


Patient Discharge Instructions: Please f/u with [facility] in 1 to 2 week 

post discharge.  New medication.  Cipro and flagyl for 10 days


Diet: Full liquid Diet for 4 days and then GI soft for another 3 days and then 

Regular 


Activity: Ad genesis

## 2018-06-15 ENCOUNTER — HOSPITAL ENCOUNTER (EMERGENCY)
Dept: HOSPITAL 97 - ER | Age: 58
Discharge: HOME | End: 2018-06-15
Payer: COMMERCIAL

## 2018-06-15 VITALS — SYSTOLIC BLOOD PRESSURE: 118 MMHG | DIASTOLIC BLOOD PRESSURE: 74 MMHG | OXYGEN SATURATION: 99 %

## 2018-06-15 VITALS — TEMPERATURE: 99.2 F

## 2018-06-15 DIAGNOSIS — E87.6: ICD-10-CM

## 2018-06-15 DIAGNOSIS — Z88.5: ICD-10-CM

## 2018-06-15 DIAGNOSIS — E83.42: Primary | ICD-10-CM

## 2018-06-15 DIAGNOSIS — E11.9: ICD-10-CM

## 2018-06-15 DIAGNOSIS — I10: ICD-10-CM

## 2018-06-15 DIAGNOSIS — E78.5: ICD-10-CM

## 2018-06-15 DIAGNOSIS — F41.9: ICD-10-CM

## 2018-06-15 DIAGNOSIS — Z79.82: ICD-10-CM

## 2018-06-15 DIAGNOSIS — Z79.84: ICD-10-CM

## 2018-06-15 DIAGNOSIS — Z79.02: ICD-10-CM

## 2018-06-15 DIAGNOSIS — F17.210: ICD-10-CM

## 2018-06-15 DIAGNOSIS — R07.9: ICD-10-CM

## 2018-06-15 DIAGNOSIS — J44.9: ICD-10-CM

## 2018-06-15 LAB
ALBUMIN SERPL BCP-MCNC: 2.9 G/DL (ref 3.2–5.5)
ALP SERPL-CCNC: 74 IU/L (ref 42–121)
ALT SERPL W P-5'-P-CCNC: 12 IU/L (ref 10–60)
AST SERPL W P-5'-P-CCNC: 22 IU/L (ref 10–42)
BUN BLD-MCNC: 7 MG/DL (ref 6–20)
CKMB CREATINE KINASE MB: 1.2 NG/ML (ref 0.3–4)
GLUCOSE SERPLBLD-MCNC: 114 MG/DL (ref 65–120)
HCT VFR BLD CALC: 37.1 % (ref 36–45)
INR BLD: 0.92
LYMPHOCYTES # SPEC AUTO: 2.4 K/UL (ref 0.7–4.9)
MAGNESIUM SERPL-MCNC: 1.5 MG/DL (ref 1.8–2.5)
MCH RBC QN AUTO: 27 PG (ref 27–35)
MCV RBC: 80.3 FL (ref 80–100)
PMV BLD: 7.5 FL (ref 7.6–11.3)
POTASSIUM SERPL-SCNC: 2.9 MEQ/L (ref 3.6–5)
RBC # BLD: 4.63 M/UL (ref 3.86–4.86)

## 2018-06-15 PROCEDURE — 36415 COLL VENOUS BLD VENIPUNCTURE: CPT

## 2018-06-15 PROCEDURE — 93005 ELECTROCARDIOGRAM TRACING: CPT

## 2018-06-15 PROCEDURE — 84484 ASSAY OF TROPONIN QUANT: CPT

## 2018-06-15 PROCEDURE — 80076 HEPATIC FUNCTION PANEL: CPT

## 2018-06-15 PROCEDURE — 82553 CREATINE MB FRACTION: CPT

## 2018-06-15 PROCEDURE — 85025 COMPLETE CBC W/AUTO DIFF WBC: CPT

## 2018-06-15 PROCEDURE — 99285 EMERGENCY DEPT VISIT HI MDM: CPT

## 2018-06-15 PROCEDURE — 83880 ASSAY OF NATRIURETIC PEPTIDE: CPT

## 2018-06-15 PROCEDURE — 85730 THROMBOPLASTIN TIME PARTIAL: CPT

## 2018-06-15 PROCEDURE — 83735 ASSAY OF MAGNESIUM: CPT

## 2018-06-15 PROCEDURE — 85610 PROTHROMBIN TIME: CPT

## 2018-06-15 PROCEDURE — 80048 BASIC METABOLIC PNL TOTAL CA: CPT

## 2018-06-15 PROCEDURE — 82550 ASSAY OF CK (CPK): CPT

## 2018-06-15 PROCEDURE — 71045 X-RAY EXAM CHEST 1 VIEW: CPT

## 2018-06-15 NOTE — EDPHYS
Physician Documentation                                                                           

 [facility]                                                                

Name: Rima Solares                                                                                

Age: 58 yrs                                                                                       

Sex: Female                                                                                       

: 1960                                                                                   

MRN: [id number]                                                                                   

Arrival Date: 06/15/2018                                                                          

Time: 15:56                                                                                       

Account#: [id number]                                                                            

Bed 30                                                                                            

Private MD:                                                                                       

ED Physician Matt Long                                                                       

HPI:                                                                                              

06/15                                                                                             

16:26 This 58 yrs old  Female presents to ER via EMS with complaints of Chest Pain > cp  

      31 y/o.                                                                                     

16:26 The patient or guardian reports chest pain that is located primarily in the anterior    cp  

      chest wall. Onset: today, at 14:00. The pain radiates to neck. Associated signs and         

      symptoms: Pertinent negatives: abdominal pain, cough, diaphoresis, dizziness, headache,     

      lower extremity pain, lower extremity swelling, near syncope, shortness of breath,          

      syncope. The chest pain is described as a pressure. Duration: The patient or guardian       

      reports a single episode, that is now resolved. EMS care prior to arrival includes:         

      aspirin, nitroglycerin, x 1.                                                                

                                                                                                  

Historical:                                                                                       

- Allergies:                                                                                      

16:16 Trazodone;                                                                              kr2 

- Home Meds:                                                                                      

16:16 metronidazole 500 mg Oral tab 1 tab 2 times per day [Active]; clarithromycin 500 mg     kr2 

      Oral tab 1 tab 2 times per day [Active]; metformin 1,000 mg Oral tab 1 tab 2 times per      

      day [Active]; BRILINTA 90 mg Oral tab [Active]; metoprolol tartrate 25 mg Oral tab 0.5      

      tab 2 times per day [Active]; pantoprazole 40 mg oral TbEC 1 tab 2 times per day            

      [Active]; aspirin 81 mg Oral chew [Active]; atorvastatin 80 mg Oral tab 1 tab nightly       

      [Active]; Paxil 20 mg Oral tab 1 tab once daily [Active]; lisinopril 5 mg oral tab 1        

      tab once daily [Active]; Vitamin D2 50,000 unit oral cap 1 cap once wkly [Active];          

      Abilify 30 mg oral tab 1 tab once daily [Active];                                           

- PMHx:                                                                                           

16:16 Anxiety; COPD; Depression; Diabetes - NIDDM; Hyperlipidemia; Hypertension;              kr2 

- PSHx:                                                                                           

16:16 cardiac stents; Hysterectomy; Neck Surgery;                                             kr2 

                                                                                                  

- Immunization history:: Adult Immunizations up to date.                                          

- Social history:: Smoking status: Patient uses tobacco products, smokes one-half pack            

  cigarettes per day.                                                                             

- Ebola Screening: : No symptoms or risks identified at this time.                                

                                                                                                  

                                                                                                  

ROS:                                                                                              

16:28 Eyes: Negative for injury, pain, redness, and discharge.                                cp  

16:28 Constitutional: Negative for body aches, chills, fever, poor PO intake.                     

16:28 ENT: Negative for drainage from ear(s), ear pain, sore throat, difficulty swallowing,       

      difficulty handling secretions.                                                             

16:28 Cardiovascular: Positive for chest pain, Negative for edema, orthopnea, palpitations.       

16:28 Respiratory: Negative for cough, shortness of breath, wheezing.                             

16:28 Abdomen/GI: Negative for abdominal pain, nausea, vomiting, and diarrhea, black/tarry        

      stool, rectal bleeding.                                                                     

16:28 Back: Negative for radiated pain.                                                           

16:28 Skin: Negative for cellulitis, rash.                                                        

16:28 Neuro: Negative for altered mental status, dizziness, headache, weakness.                   

16:28 All other systems are negative.                                                             

                                                                                                  

Exam:                                                                                             

16:32 ECG was reviewed by the Attending Physician.                                            cp  

16:35 Constitutional: The patient appears in no acute distress, alert, awake,                 cp  

      non-diaphoretic, non-toxic, well developed, well nourished.                                 

16:35 Head/Face:  Normocephalic, atraumatic. Eyes:  Pupils equal round and reactive to light, cp  

      extra-ocular motions intact.  Lids and lashes normal.  Conjunctiva and sclera are           

      non-icteric and not injected.  Cornea within normal limits.  Periorbital areas with no      

      swelling, redness, or edema. ENT:  Nares patent. No nasal discharge, no septal              

      abnormalities noted.  Tympanic membranes are normal and external auditory canals are        

      clear.  Oropharynx with no redness, swelling, or masses, exudates, or evidence of           

      obstruction, uvula midline.  Mucous membranes moist. Neck:  Trachea midline, no             

      thyromegaly or masses palpated, and no cervical lymphadenopathy.  Supple, full range of     

      motion without nuchal rigidity, or vertebral point tenderness.  No Meningismus.             

      Chest/axilla:  Normal chest wall appearance and motion.  Nontender with no deformity.       

      No lesions are appreciated.                                                                 

16:35 Cardiovascular: Rate: normal, Rhythm: regular, Pulses: Pulses are 2+ in right radial        

      artery and left radial artery. Edema: is not appreciated, JVD: is not appreciated.          

16:35 Respiratory: the patient does not display signs of respiratory distress,  Respirations:     

      normal, no use of accessory muscles, no retractions, no splinting, no tachypnea,            

      labored breathing, is not present, Breath sounds: are clear throughout, no decreased        

      breath sounds, no stridor, no wheezing.                                                     

16:35 Abdomen/GI: Inspection: abdomen appears normal, Bowel sounds: active, all quadrants,        

      Palpation: abdomen is soft and non-tender, in all quadrants, rebound tenderness, is not     

      appreciated, voluntary guarding, is not appreciated, involuntary guarding, is not           

      appreciated.                                                                                

16:35 Back: pain, is absent, ROM is normal.                                                       

16:35 Skin: cellulitis, is not appreciated, no rash present.                                      

16:35 Neuro: Orientation: to person, place \T\ time. Mentation: is normal, Cerebellar function:   

      is grossly normal, Motor: moves all fours, strength is normal, Sensation: no obvious        

      gross deficits.                                                                             

                                                                                                  

Vital Signs:                                                                                      

16:02  / 73; Pulse 86; Resp 22; Temp 99.2; Pulse Ox 97% on R/A; Weight 75.75 kg; Height kr2 

      5 ft. 3 in. (160.02 cm); Pain 3/10;                                                         

16:51  / 68; Pulse 79; Resp 16; Pulse Ox 97% on R/A;                                    kr2 

17:06  / 76; Pulse 67; Resp 16; Pulse Ox 98% on R/A;                                    kr2 

18:50  / 74; Pulse 70; Resp 18; Pulse Ox 99% ;                                          kr2 

16:02 Body Mass Index 29.58 (75.75 kg, 160.02 cm)                                             kr2 

                                                                                                  

MDM:                                                                                              

16:18 Patient medically screened.                                                             cp  

16:28 The patient was not given aspirin in the Emergency Department. Administered by EMS.     cp  

16:30 Differential diagnosis: acute myocardial infarction, acute pericarditis, chest wall     cp  

      pain, gastritis, myocarditis, pancreatitis, pulmonary embolus, stable angina, thoracic      

      aortic disection, unstable angina.                                                          

17:33 Physician consultation: Ebony Arechiga MD was contacted at 17:33, regarding admission,   cp  

      to the telemetry unit. patient's condition, and will see patient in ED, immediately,        

      recommends repeat troponin and EKG, if negative patient can f/u outpatient.                 

18:21 Data reviewed: vital signs, nurses notes, lab test result(s), EKG, radiologic studies,  cp  

      plain films. Refusal of service: The patient/guardian displays adequate decision making     

      capability and despite a detailed discussion of alternatives, benefits, risks, and          

      consequences refuses: continued observation and IV potassium and IV magnesium. Will         

      discharge to home for continued monitoring.                                                 

                                                                                                  

06/15                                                                                             

16:18 Order name: Basic Metabolic Panel; Complete Time: 17:26                                 cp  

06/15                                                                                             

17:53 Interpretation: Normal except: CA 8.4; GFR 71; K 2.9.                                   cp  

/15                                                                                             

16:18 Order name: BNP; Complete Time: 17:20                                                   cp  

06/15                                                                                             

16:18 Order name: CBC with Diff; Complete Time: 17:12                                         cp  

/15                                                                                             

17:12 Interpretation: Normal except: RDW 18.8; MPV 7.5.                                       cp  

/15                                                                                             

16:18 Order name: Ckmb; Complete Time: 17:26                                                  cp  

/15                                                                                             

16:18 Order name: CPK; Complete Time: 17:26                                                   cp  

/15                                                                                             

16:18 Order name: LFT's; Complete Time: 17:26                                                 cp  

15                                                                                             

16:18 Order name: Magnesium; Complete Time: 17:26                                             cp  

15                                                                                             

16:18 Order name: PT-INR; Complete Time: 17:12                                                cp  

15                                                                                             

16:18 Order name: Ptt, Activated; Complete Time: 17:12                                        cp  

/15                                                                                             

16:18 Order name: Troponin (emerg Dept Use Only); Complete Time: 17:52                        cp  

/15                                                                                             

17:52 Interpretation: TROPED < 0.03; Reviewed.                                                cp  

06/15                                                                                             

16:18 Order name: XRAY Chest (1 view); Complete Time: 17:12                                   cp  

/15                                                                                             

16:18 Order name: EKG; Complete Time: 16:18                                                   cp  

15                                                                                             

16:18 Order name: Cardiac monitoring; Complete Time: 16:22                                    cp  

/15                                                                                             

16:18 Order name: EKG - Nurse/Tech; Complete Time: 16:43                                      cp  

/15                                                                                             

16:18 Order name: IV Saline Lock; Complete Time: 16:22                                        cp  

/15                                                                                             

16:18 Order name: Labs collected and sent; Complete Time: 16:22                               cp  

06/15                                                                                             

16:18 Order name: O2 Per Protocol; Complete Time: 16:23                                       cp  

06/15                                                                                             

16:18 Order name: O2 Sat Monitoring; Complete Time: 16:23                                     cp  

                                                                                                  

EC:32 Rate is 82 beats/min. Rhythm is regular. [place] interval is normal. QRS interval is normal. cp  

      QT interval is normal. No ST changes noted. Interpreted by me. Reviewed by me.              

                                                                                                  

Administered Medications:                                                                         

18:24 CANCELLED (Patient Refused): Magnesium Sulfate 2 grams IVPB once over 2 hrs             cp  

18:24 CANCELLED (Patient Refused): Potassium Chloride 20 mEq IV at calculated rate once;      cp  

      administer over 1-2 hours                                                                   

18:51 Drug: Potassium Chloride 40 mEq Route: PO;                                              kr2 

18:52 Follow up: Response: Medication administered at discharge.                              kr2 

18:51 Drug: Potassium Chloride 40 mEq Route: PO;                                              kr2 

18:52 Follow up: Response: Medication administered at discharge.                              kr2 

18:52 Not Given (Patient Refused): Magnesium 800 mg PO once                                   kr2 

                                                                                                  

                                                                                                  

Disposition:                                                                                      

06/15/18 18:23 Discharged to Home. Impression: Hypomagnesemia, Hypokalemia, Chest pain,           

  unspecified.                                                                                    

- Condition is Stable.                                                                            

- Discharge Instructions: Nonspecific Chest Pain, Hypomagnesemia, Aspirin and Your                

  Heart, Hypokalemia.                                                                             

                                                                                                  

- Medication Reconciliation Form, Thank You Letter, Antibiotic Education, Prescription            

  Opioid Use form.                                                                                

- Follow up: Private Physician; When: 1 - 2 days; Reason: Recheck today's complaints.             

- Problem is new.                                                                                 

- Symptoms have improved.                                                                         

                                                                                                  

                                                                                                  

                                                                                                  

Addendum:                                                                                         

2018                                                                                        

     07:05 Co-signature as Attending Physician, Matt Long MD I agree with the assessment and   k
dr

           plan of care.                                                                          

                                                                                                  

Signatures:                                                                                       

Dispatcher [employer]                           EDMS                                                 

Matt Long MD MD   [facility]                                                  

Edgardo Alfaro PA                         PA   cp                                                   

Sheri Tello RN                       RN   kr2                                                  

                                                                                                  

Corrections: (The following items were deleted from the chart)                                    

06/15                                                                                             

16:22 16:18 Urine Pregnancy Test ordered. cp                                                  kr2 

17:53 17:12 Normal except: CA 8.4; GFR 71. cp                                                 cp  

18:24 17:22 Magnesium Sulfate 2 grams IVPB once over 2 hrs ordered. cp                        cp  

18:24 17:22 Potassium Chloride 20 mEq IV at calculated rate once; administer over 1-2 hours   cp  

      ordered. cp                                                                                 

18:54 18:23 06/15/2018 18:23 Discharged to Home. Impression: Hypomagnesemia; Hypokalemia;     kr2 

      Chest pain, unspecified. Condition is Stable. Forms are Medication Reconciliation Form,     

      Thank You Letter, Antibiotic Education, Prescription Opioid Use. Follow up: Private         

      Physician; When: 1 - 2 days; Reason: Recheck today's complaints. Problem is new.            

      Symptoms have improved. cp                                                                  

                                                                                                  

**************************************************************************************************

## 2018-06-15 NOTE — ER
Nurse's Notes                                                                                     

 [facility]                                                                

Name: Rima Solares                                                                                

Age: 58 yrs                                                                                       

Sex: Female                                                                                       

: 1960                                                                                   

MRN: [id number]                                                                                   

Arrival Date: 06/15/2018                                                                          

Time: 15:56                                                                                       

Account#: [id number]                                                                            

Bed 30                                                                                            

Private MD:                                                                                       

Diagnosis: Hypomagnesemia;Hypokalemia;Chest pain, unspecified                                     

                                                                                                  

Presentation:                                                                                     

06/15                                                                                             

15:57 Presenting complaint: EMS states: Patient is complaining of chest discomfort that is    kr2 

      the center of her chest that started approximately 1.5 hours ago. She received 324mg of     

      ASA and 0.4mg of Nitroglycerin. She did get relief after the nitro. She has a history       

      of cardiac arrest and she has had 2 stents placed 2 years ago. Transition of care:          

      patient was not received from another setting of care. Onset of symptoms was Larissa 15,       

      2018 at 14:30. Risk Assessment: Do you want to hurt yourself or someone else? Patient       

      reports no desire to harm self or others. Initial Sepsis Screen: Does the patient meet      

      any 2 criteria? No. Patient's initial sepsis screen is negative. Does the patient have      

      a suspected source of infection? No. Patient's initial sepsis screen is negative. Care      

      prior to arrival: Medication(s) given: ASA, 81 mg, x 4, Nitroglycerin, 0.4 mg SL.           

15:57 Method Of Arrival: EMS: [facility]                                                    kr2 

15:57 Acuity: MARCELA 3                                                                           kr2 

                                                                                                  

Triage Assessment:                                                                                

16:03 General: Appears in no apparent distress. comfortable, unkempt, well developed, well    kr2 

      nourished, Behavior is calm, cooperative, appropriate for age. Pain: Complains of pain      

      in chest Pain does not radiate. Pain currently is 3 out of 10 on a pain scale. Quality      

      of pain is described as pressure, Pain began 1.5 hours ago Is continuous, Alleviated by     

      medications, rest. Cardiovascular: Heart tones S1 S2 present Capillary refill < 3           

      seconds in bilateral fingers Patient's skin is warm and dry. Rhythm is sinus rhythm.        

16:03 EENT: Oral mucosa is moist. Neuro: Level of Consciousness is awake, alert, obeys        kr2 

      commands, Oriented to person, place, time, situation, Appropriate for age Reports           

      paresthesias in right hand and left hand since 1.5 hours ago. Respiratory: Airway is        

      patent Respiratory effort is even, unlabored, Respiratory pattern is regular,               

      symmetrical. GI: Abdomen is round non-distended. : Denies burning with urination.         

      Derm: Skin is intact, is healthy with good turgor, Skin is pink, warm \T\ dry.              

      Musculoskeletal: Circulation, motion, and sensation intact. Range of motion: intact in      

      all extremities.                                                                            

                                                                                                  

Historical:                                                                                       

- Allergies:                                                                                      

16:16 Trazodone;                                                                              kr2 

- Home Meds:                                                                                      

16:16 metronidazole 500 mg Oral tab 1 tab 2 times per day [Active]; clarithromycin 500 mg     kr2 

      Oral tab 1 tab 2 times per day [Active]; metformin 1,000 mg Oral tab 1 tab 2 times per      

      day [Active]; BRILINTA 90 mg Oral tab [Active]; metoprolol tartrate 25 mg Oral tab 0.5      

      tab 2 times per day [Active]; pantoprazole 40 mg oral TbEC 1 tab 2 times per day            

      [Active]; aspirin 81 mg Oral chew [Active]; atorvastatin 80 mg Oral tab 1 tab nightly       

      [Active]; Paxil 20 mg Oral tab 1 tab once daily [Active]; lisinopril 5 mg oral tab 1        

      tab once daily [Active]; Vitamin D2 50,000 unit oral cap 1 cap once wkly [Active];          

      Abilify 30 mg oral tab 1 tab once daily [Active];                                           

- PMHx:                                                                                           

16:16 Anxiety; COPD; Depression; Diabetes - NIDDM; Hyperlipidemia; Hypertension;              kr2 

- PSHx:                                                                                           

16:16 cardiac stents; Hysterectomy; Neck Surgery;                                             kr2 

                                                                                                  

- Immunization history:: Adult Immunizations up to date.                                          

- Social history:: Smoking status: Patient uses tobacco products, smokes one-half pack            

  cigarettes per day.                                                                             

- Ebola Screening: : No symptoms or risks identified at this time.                                

                                                                                                  

                                                                                                  

Screenin:01 Abuse screen: Denies threats or abuse. Denies injuries from another. Nutritional        kr2 

      screening: No deficits noted. Tuberculosis screening: No symptoms or risk factors           

      identified. Fall Risk None identified.                                                      

                                                                                                  

Assessment:                                                                                       

16:20 Reassessment: See triage assessment. Pain: Complains of pain in chest Pain does not     kr2 

      radiate. Pain currently is 3 out of 10 on a pain scale. Quality of pain is described as     

      pressure.                                                                                   

16:51 Reassessment: Patient appears in no apparent distress at this time. Patient and/or      kr2 

      family updated on plan of care and expected duration. Pain level reassessed. Patient is     

      alert, oriented x 3, equal unlabored respirations, skin warm/dry/pink. Patient denies       

      pain at this time.                                                                          

                                                                                                  

Vital Signs:                                                                                      

16:02  / 73; Pulse 86; Resp 22; Temp 99.2; Pulse Ox 97% on R/A; Weight 75.75 kg; Height kr2 

      5 ft. 3 in. (160.02 cm); Pain 3/10;                                                         

16:51  / 68; Pulse 79; Resp 16; Pulse Ox 97% on R/A;                                    kr2 

17:06  / 76; Pulse 67; Resp 16; Pulse Ox 98% on R/A;                                    kr2 

18:50  / 74; Pulse 70; Resp 18; Pulse Ox 99% ;                                          kr2 

16:02 Body Mass Index 29.58 (75.75 kg, 160.02 cm)                                             kr2 

                                                                                                  

ED Course:                                                                                        

15:56 Patient arrived in ED.                                                                  kr2 

16:01 Triage completed.                                                                       kr2 

16:07 Arm band placed on.                                                                     kr2 

16:08 Patient has correct armband on for positive identification. Bed in low position. Call   kr2 

      light in reach. Side rails up X 1. Cardiac monitor on. Pulse ox on. NIBP on. Door           

      closed. Head of bed elevated.                                                               

16:16 Maintain EMS IV. Dressing intact. Site clean \T\ dry. Gauge \T\ site: 20g left wrist.       kr
2

      Patient maintains SpO2 saturation greater than 95% on room air.                             

16:17 Edgardo Alfaro PA is PHCP.                                                                cp  

16:17 Matt Long MD is Attending Physician.                                              cp  

16:18 Sheri Tello RN is Primary Nurse.                                                     kr2 

16:35 Inserted saline lock: 22 gauge in right forearm, using aseptic technique. Blood         kr2 

      collected.                                                                                  

16:44 X-ray completed. Portable x-ray completed in exam room. Patient tolerated procedure     ml  

      well.                                                                                       

16:46 XRAY Chest (1 view) In Process Unspecified.                                             EDMS

16:48 EKG done, by EKG tech. reviewed by Edgardo WORTHY.                                       sm3 

18:52 No provider procedures requiring assistance completed. IV discontinued, intact,         kr2 

      bleeding controlled, No redness/swelling at site. Pressure dressing applied.                

                                                                                                  

Administered Medications:                                                                         

18:24 CANCELLED (Patient Refused): Magnesium Sulfate 2 grams IVPB once over 2 hrs             cp  

18:24 CANCELLED (Patient Refused): Potassium Chloride 20 mEq IV at calculated rate once;      cp  

      administer over 1-2 hours                                                                   

18:51 Drug: Potassium Chloride 40 mEq Route: PO;                                              kr2 

18:52 Follow up: Response: Medication administered at discharge.                              kr2 

18:51 Drug: Potassium Chloride 40 mEq Route: PO;                                              kr2 

18:52 Follow up: Response: Medication administered at discharge.                              kr2 

18:52 Not Given (Patient Refused): Magnesium 800 mg PO once                                   kr2 

                                                                                                  

                                                                                                  

Outcome:                                                                                          

18:23 Discharge ordered by MD.                                                                cp  

18:53 Discharged to home ambulatory, with family.                                             kr2 

18:53 Condition: good                                                                             

18:53 Discharge instructions given to patient, family, Instructed on discharge instructions,      

      follow up and referral plans. medication usage, Demonstrated understanding of               

      instructions, follow-up care, medications.                                                  

18:54 Patient left the ED.                                                                    kr2 

                                                                                                  

Signatures:                                                                                       

Dispatcher [employer]                           EDMS                                                 

Noemí Donaldson Corey, PA PA cp Reaves, Karey, RN                       RN   kr2                                                  

Cindy Nina                              3                                                  

                                                                                                  

Corrections: (The following items were deleted from the chart)                                    

16:03 16:02  / 73; Pulse 86bpm; Resp 22bpm; Pulse Ox 97% RA; Temp 99.2F; 75.75 kg;      kr2 

      Height 5 ft. 3 in.; BMI: 29.5; kr2                                                          

16:51 16:20 Pain: Complains of pain in chest Pain currently is 3 out of 10 on a pain scale.   kr2 

      kr2                                                                                         

17:08 17:06 No provider procedures requiring assistance completed. kr2                        kr2 

17:08 17:06 IV discontinued, intact, bleeding controlled, No redness/swelling at site.        kr2 

      Pressure dressing applied, kr2                                                              

17:08 17:06 Condition: good kr2                                                               kr2 

17:08 17:06 Discharged to home ambulatory, with friend, kr2                                   kr2 

17:08 17:06 Discharge instructions given to patient, Instructed on discharge instructions,    kr2 

      follow up and referral plans. Demonstrated understanding of instructions, follow-up         

      care, kr2                                                                                   

                                                                                                  

**************************************************************************************************

## 2018-06-15 NOTE — RAD REPORT
EXAM DESCRIPTION:  RAD - Chest Single View - 6/15/2018 4:48 pm

 

CLINICAL HISTORY:  Chest pain.

 

COMPARISON:  05/03/2018, 03/24/2017

 

FINDINGS:  Portable technique limits examination quality.

 

The lungs are grossly clear. The heart is upper limit normal size. No displaced fractures.Hardware pl
ate is present cervical spine.

 

IMPRESSION:  No acute intrathoracic process suspected.

## 2018-06-16 NOTE — EKG
Test Date:    2018-06-15               Test Time:    16:27:29

Technician:   JULIET                                     

                                                     

MEASUREMENT RESULTS:                                       

Intervals:                                           

Rate:         82                                     

AZ:           190                                    

QRSD:         94                                     

QT:           392                                    

QTc:          457                                    

Axis:                                                

P:            72                                     

AZ:           190                                    

QRS:          65                                     

T:            18                                     

                                                     

INTERPRETIVE STATEMENTS:                                       

                                                     

Normal sinus rhythm

Possible Left atrial enlargement

Borderline ECG

Compared to ECG 05/08/2018 16:23:16

Myocardial infarct finding no longer present



Electronically Signed On 06-16-18 06:15:10 CDT by Campos Parkinson